# Patient Record
Sex: FEMALE | Race: WHITE | NOT HISPANIC OR LATINO
[De-identification: names, ages, dates, MRNs, and addresses within clinical notes are randomized per-mention and may not be internally consistent; named-entity substitution may affect disease eponyms.]

---

## 2019-08-01 ENCOUNTER — TRANSCRIPTION ENCOUNTER (OUTPATIENT)
Age: 39
End: 2019-08-01

## 2023-04-24 ENCOUNTER — FORM ENCOUNTER (OUTPATIENT)
Age: 43
End: 2023-04-24

## 2023-04-25 ENCOUNTER — APPOINTMENT (OUTPATIENT)
Dept: MRI IMAGING | Facility: CLINIC | Age: 43
End: 2023-04-25
Payer: COMMERCIAL

## 2023-04-25 ENCOUNTER — APPOINTMENT (OUTPATIENT)
Dept: ORTHOPEDIC SURGERY | Facility: CLINIC | Age: 43
End: 2023-04-25
Payer: COMMERCIAL

## 2023-04-25 VITALS — BODY MASS INDEX: 30.21 KG/M2 | WEIGHT: 160 LBS | HEIGHT: 61 IN

## 2023-04-25 DIAGNOSIS — Z78.9 OTHER SPECIFIED HEALTH STATUS: ICD-10-CM

## 2023-04-25 PROCEDURE — 73721 MRI JNT OF LWR EXTRE W/O DYE: CPT | Mod: RT

## 2023-04-25 PROCEDURE — 73564 X-RAY EXAM KNEE 4 OR MORE: CPT | Mod: RT

## 2023-04-25 PROCEDURE — E0114: CPT | Mod: NU

## 2023-04-25 PROCEDURE — 99213 OFFICE O/P EST LOW 20 MIN: CPT | Mod: 25

## 2023-04-25 RX ORDER — OXYBUTYNIN CHLORIDE 2.5 MG/1
TABLET ORAL
Refills: 0 | Status: ACTIVE | COMMUNITY

## 2023-04-26 NOTE — PHYSICAL EXAM
[Right] : right knee [] : no sign of infection [FreeTextEntry3] : abrasion over proximal tibia [TWNoteComboBox7] : flexion 90 degrees [de-identified] : extension 5 degrees
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
## 2023-04-26 NOTE — DISCUSSION/SUMMARY
[de-identified] : MRI of right knee to eval tibial bone bruise\par Crutches were provided today.\par Follow up after MRI.\par \par -----------------------------------------------\par Home Exercise\par The patient is instructed on a home exercise program.\par \par KAYCE COVINGTON Acting as a Scribe for Dr. Baeza\par I, Kayce Covington, attest that this documentation has been prepared under the direction and in the presence of Provider Jose Baeza MD.\par \par Activity Modification\par The patient was advised to modify their activities.\par \par Dx / Natural History\par The patient was advised of the diagnosis.  The natural history of the pathology was explained in full to the patient in layman's terms.  Several different treatment options were discussed and explained in full to the patient including the risks and benefits of both surgical and non-surgical treatments.  All questions and concerns were answered.\par \par Pain Guide Activities\par The patient was advised to let pain guide the gradual advancement of activities.\par \par RICE\par I explained to the patient that rest, ice, compression, and elevation would benefit them.  They may return to activity after follow-up or when they no longer have any pain.\par \par The patient's current medication management of their orthopedic diagnosis was reviewed today:\par (1) We discussed a comprehensive treatment plan that included possible pharmaceutical management involving the use of prescription strength medications including but not limited to options such as oral Naprosyn 500mg BID, once daily Meloxicam 15 mg, or 500-650 mg Tylenol versus over the counter oral medications and topical prescription NSAID Pennsaid vs over the counter Voltaren gel.\par (2) There is a moderate risk of morbidity with further treatment, especially from use of prescription strength medications and possible side effects of these medications which include upset stomach with oral medications, skin reactions to topical medications and cardiac/renal issues with long term use.\par (3) I recommended that the patient follow-up with their medical physician to discuss any significant specific potential issues with long term medication use such as interactions with current medications or with exacerbation of underlying medical comorbidities.\par (4) The benefits and risks associated with use of injectable, oral or topical, prescription and over the counter anti-inflammatory medications were discussed with the patient. The patient voiced understanding of the risks including but not limited to bleeding, stroke, kidney dysfunction, heart disease, and were referred to the black box warning label for further information.

## 2023-04-28 ENCOUNTER — TRANSCRIPTION ENCOUNTER (OUTPATIENT)
Age: 43
End: 2023-04-28

## 2023-04-28 ENCOUNTER — APPOINTMENT (OUTPATIENT)
Dept: ORTHOPEDIC SURGERY | Facility: CLINIC | Age: 43
End: 2023-04-28
Payer: COMMERCIAL

## 2023-04-28 VITALS — HEIGHT: 61 IN | WEIGHT: 160 LBS | BODY MASS INDEX: 30.21 KG/M2

## 2023-04-28 PROCEDURE — 99213 OFFICE O/P EST LOW 20 MIN: CPT

## 2023-04-28 PROCEDURE — 99212 OFFICE O/P EST SF 10 MIN: CPT
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
## 2023-04-28 NOTE — DISCUSSION/SUMMARY
[de-identified] : Reviewed all images with patient.\par patient is unable to do pt due to her social conditions \par Packet of exercises provided for home strengthening and/or stretching.\par Advised patient to wear Reparel knee sleeve, informational card provided.\par Patient will follow up in 3 weeks.\par \par ***her  is doing chemo and she has to drive to [facility].\par \par -----------------------------------------------\par Home Exercise\par The patient is instructed on a home exercise program.\par \par CHAR JENSEN Acting as a Scribe for Dr. Baeza\par I, Char Jensen, attest that this documentation has been prepared under the direction and in the presence of Provider Jose Baeza MD.\par \par Activity Modification\par The patient was advised to modify their activities.\par \par Dx / Natural History\par The patient was advised of the diagnosis.  The natural history of the pathology was explained in full to the patient in layman's terms.  Several different treatment options were discussed and explained in full to the patient including the risks and benefits of both surgical and non-surgical treatments.  All questions and concerns were answered.\par \par Pain Guide Activities\par The patient was advised to let pain guide the gradual advancement of activities.\par \par RICE\par I explained to the patient that rest, ice, compression, and elevation would benefit them.  They may return to activity after follow-up or when they no longer have any pain.\par \par The patient's current medication management of their orthopedic diagnosis was reviewed today:\par (1) We discussed a comprehensive treatment plan that included possible pharmaceutical management involving the use of prescription strength medications including but not limited to options such as oral Naprosyn 500mg BID, once daily Meloxicam 15 mg, or 500-650 mg Tylenol versus over the counter oral medications and topical prescription NSAID Pennsaid vs over the counter Voltaren gel.\par (2) There is a moderate risk of morbidity with further treatment, especially from use of prescription strength medications and possible side effects of these medications which include upset stomach with oral medications, skin reactions to topical medications and cardiac/renal issues with long term use.\par (3) I recommended that the patient follow-up with their medical physician to discuss any significant specific potential issues with long term medication use such as interactions with current medications or with exacerbation of underlying medical comorbidities.\par (4) The benefits and risks associated with use of injectable, oral or topical, prescription and over the counter anti-inflammatory medications were discussed with the patient. The patient voiced understanding of the risks including but not limited to bleeding, stroke, kidney dysfunction, heart disease, and were referred to the black box warning label for further information.\par \par

## 2023-04-28 NOTE — PHYSICAL EXAM
[Right] : right knee [de-identified] : Neurologic: normal sensation, normal mood and affect, orientated and able to communicate\par \par \par Right Knee:\par NO effusion, FULL ROM\par neurovascularly intact\par ligamentously stable\par Non-tender\par  [] : no sign of infection [FreeTextEntry3] : abrasion over proximal tibia [TWNoteComboBox7] : flexion 90 degrees [de-identified] : extension 5 degrees

## 2023-05-23 ENCOUNTER — APPOINTMENT (OUTPATIENT)
Dept: ORTHOPEDIC SURGERY | Facility: CLINIC | Age: 43
End: 2023-05-23
Payer: COMMERCIAL

## 2023-05-23 VITALS — HEIGHT: 61 IN | BODY MASS INDEX: 30.21 KG/M2 | WEIGHT: 160 LBS

## 2023-05-23 PROCEDURE — 99213 OFFICE O/P EST LOW 20 MIN: CPT
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
## 2023-05-23 NOTE — DISCUSSION/SUMMARY
[de-identified] : Physical therapy prescribed for strengthening and stretching. \par Patient will follow up in 6 weeks. \par \par ***her  is doing chemo and she has to drive to city.\par -----------------------------------------------\par Home Exercise\par The patient is instructed on a home exercise program.\par \par KAYCE COVINGTON Acting as a Scribe for Dr. Baeza\par I, Kayce Covington, attest that this documentation has been prepared under the direction and in the presence of Provider Jose Baeza MD.\par \par Activity Modification\par The patient was advised to modify their activities.\par \par Dx / Natural History\par The patient was advised of the diagnosis.  The natural history of the pathology was explained in full to the patient in layman's terms.  Several different treatment options were discussed and explained in full to the patient including the risks and benefits of both surgical and non-surgical treatments.  All questions and concerns were answered.\par \par Pain Guide Activities\par The patient was advised to let pain guide the gradual advancement of activities.\par \par RICE\par I explained to the patient that rest, ice, compression, and elevation would benefit them.  They may return to activity after follow-up or when they no longer have any pain.\par \par The patient's current medication management of their orthopedic diagnosis was reviewed today:\par (1) We discussed a comprehensive treatment plan that included possible pharmaceutical management involving the use of prescription strength medications including but not limited to options such as oral Naprosyn 500mg BID, once daily Meloxicam 15 mg, or 500-650 mg Tylenol versus over the counter oral medications and topical prescription NSAID Pennsaid vs over the counter Voltaren gel.\par (2) There is a moderate risk of morbidity with further treatment, especially from use of prescription strength medications and possible side effects of these medications which include upset stomach with oral medications, skin reactions to topical medications and cardiac/renal issues with long term use.\par (3) I recommended that the patient follow-up with their medical physician to discuss any significant specific potential issues with long term medication use such as interactions with current medications or with exacerbation of underlying medical comorbidities.\par (4) The benefits and risks associated with use of injectable, oral or topical, prescription and over the counter anti-inflammatory medications were discussed with the patient. The patient voiced understanding of the risks including but not limited to bleeding, stroke, kidney dysfunction, heart disease, and were referred to the black box warning label for further information.

## 2023-05-23 NOTE — PHYSICAL EXAM
[Right] : right knee [de-identified] : Neurologic: normal sensation, normal mood and affect, orientated and able to communicate\par \par \par Right Knee:\par NO effusion, FULL ROM\par neurovascularly intact\par ligamentously stable\par Non-tender\par  [] : no sign of infection [FreeTextEntry3] : abrasion over proximal tibia [TWNoteComboBox7] : flexion 90 degrees [de-identified] : extension 5 degrees

## 2023-05-23 NOTE — DATA REVIEWED
[FreeTextEntry1] : 04/25/23\par oc mri of the right knee:\par Impression: \par 1. High-grade partial tearing of the posterior cruciate ligament which appears thickened and T2 hyperintense \par with attenuation and severe surrounding synovitis particularly involving [id number] fibers.\par 2. Mild ACL sprain, mild MCL sprain, and mild fibular collateral ligament sprain with moderate effusion and \par synovitis and mild-to-moderate soft tissue swelling and bursitis anteriorly with mild posterior lateral muscle \par strains.\par 3. Medial meniscal degeneration without tear.\par 4. No acute osseous injury.\par 5. Clinical correlation regarding posterior instability and clinical follow up is recommended.

## 2023-06-20 ENCOUNTER — APPOINTMENT (OUTPATIENT)
Dept: ORTHOPEDIC SURGERY | Facility: CLINIC | Age: 43
End: 2023-06-20
Payer: COMMERCIAL

## 2023-06-20 VITALS — WEIGHT: 160 LBS | BODY MASS INDEX: 30.21 KG/M2 | HEIGHT: 61 IN

## 2023-06-20 DIAGNOSIS — M25.561 PAIN IN RIGHT KNEE: ICD-10-CM

## 2023-06-20 DIAGNOSIS — M79.18 MYALGIA, OTHER SITE: ICD-10-CM

## 2023-06-20 DIAGNOSIS — S89.91XA UNSPECIFIED INJURY OF RIGHT LOWER LEG, INITIAL ENCOUNTER: ICD-10-CM

## 2023-06-20 PROCEDURE — 99213 OFFICE O/P EST LOW 20 MIN: CPT

## 2023-06-20 NOTE — DISCUSSION/SUMMARY
[de-identified] : Advised to use knee sleeve.\par Discussed treatment options, the patient would like to follow through with physical therapy.\par Physical therapy prescribed for strengthening and stretching. \par Patient will follow up in 6 weeks. \par \par \par \par ***her  is doing chemo and she has to drive to [facility].\par -----------------------------------------------\par Home Exercise\par The patient is instructed on a home exercise program.\par \par KAYCE COVINGTON Acting as a Scribe for Dr. Baeza\par I, Kayce Covington, attest that this documentation has been prepared under the direction and in the presence of Provider Jose Baeza MD.\par \par Activity Modification\par The patient was advised to modify their activities.\par \par Dx / Natural History\par The patient was advised of the diagnosis.  The natural history of the pathology was explained in full to the patient in layman's terms.  Several different treatment options were discussed and explained in full to the patient including the risks and benefits of both surgical and non-surgical treatments.  All questions and concerns were answered.\par \par Pain Guide Activities\par The patient was advised to let pain guide the gradual advancement of activities.\par \par RICE\par I explained to the patient that rest, ice, compression, and elevation would benefit them.  They may return to activity after follow-up or when they no longer have any pain.\par \par The patient's current medication management of their orthopedic diagnosis was reviewed today:\par (1) We discussed a comprehensive treatment plan that included possible pharmaceutical management involving the use of prescription strength medications including but not limited to options such as oral Naprosyn 500mg BID, once daily Meloxicam 15 mg, or 500-650 mg Tylenol versus over the counter oral medications and topical prescription NSAID Pennsaid vs over the counter Voltaren gel.\par (2) There is a moderate risk of morbidity with further treatment, especially from use of prescription strength medications and possible side effects of these medications which include upset stomach with oral medications, skin reactions to topical medications and cardiac/renal issues with long term use.\par (3) I recommended that the patient follow-up with their medical physician to discuss any significant specific potential issues with long term medication use such as interactions with current medications or with exacerbation of underlying medical comorbidities.\par (4) The benefits and risks associated with use of injectable, oral or topical, prescription and over the counter anti-inflammatory medications were discussed with the patient. The patient voiced understanding of the risks including but not limited to bleeding, stroke, kidney dysfunction, heart disease, and were referred to the black box warning label for further information.

## 2023-06-20 NOTE — PHYSICAL EXAM
[Right] : right knee [de-identified] : Neurologic: normal sensation, normal mood and affect, orientated and able to communicate\par \par \par Right Knee:\par NO effusion, FULL ROM\par neurovascularly intact\par ligamentously stable\par Non-tender\par  [] : no sign of infection [FreeTextEntry3] : abrasion over proximal tibia [de-identified] : +1 posterior drawer [TWNoteComboBox7] : flexion 90 degrees [de-identified] : extension 5 degrees

## 2023-06-20 NOTE — DATA REVIEWED
[FreeTextEntry1] : 04/25/23\par oc mri of the right knee:\par Impression: \par 1. High-grade partial tearing of the posterior cruciate ligament which appears thickened and T2 hyperintense \par with attenuation and severe surrounding synovitis particularly involving [id number] fibers.\par 2. Mild ACL sprain, mild MCL sprain, and mild fibular collateral ligament sprain with moderate effusion and \par synovitis and mild-to-moderate soft tissue swelling and bursitis anteriorly with mild posterior lateral muscle \par strains.\par 3. Medial meniscal degeneration without tear.\par 4. No acute osseous injury.\par 5. Clinical correlation regarding posterior instability and clinical follow up is recommended.
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
## 2023-08-01 ENCOUNTER — APPOINTMENT (OUTPATIENT)
Dept: ORTHOPEDIC SURGERY | Facility: CLINIC | Age: 43
End: 2023-08-01